# Patient Record
Sex: FEMALE | Race: WHITE | NOT HISPANIC OR LATINO
[De-identification: names, ages, dates, MRNs, and addresses within clinical notes are randomized per-mention and may not be internally consistent; named-entity substitution may affect disease eponyms.]

---

## 2022-04-26 ENCOUNTER — APPOINTMENT (OUTPATIENT)
Dept: ORTHOPEDIC SURGERY | Facility: CLINIC | Age: 47
End: 2022-04-26

## 2022-05-10 ENCOUNTER — APPOINTMENT (OUTPATIENT)
Dept: ORTHOPEDIC SURGERY | Facility: CLINIC | Age: 47
End: 2022-05-10
Payer: COMMERCIAL

## 2022-05-10 VITALS
HEART RATE: 73 BPM | HEIGHT: 62 IN | DIASTOLIC BLOOD PRESSURE: 87 MMHG | SYSTOLIC BLOOD PRESSURE: 122 MMHG | BODY MASS INDEX: 28.52 KG/M2 | WEIGHT: 155 LBS

## 2022-05-10 DIAGNOSIS — M60.9 MYOSITIS, UNSPECIFIED: ICD-10-CM

## 2022-05-10 DIAGNOSIS — M51.36 OTHER INTERVERTEBRAL DISC DEGENERATION, LUMBAR REGION: ICD-10-CM

## 2022-05-10 DIAGNOSIS — Z86.39 PERSONAL HISTORY OF OTHER ENDOCRINE, NUTRITIONAL AND METABOLIC DISEASE: ICD-10-CM

## 2022-05-10 PROCEDURE — 72100 X-RAY EXAM L-S SPINE 2/3 VWS: CPT

## 2022-05-10 PROCEDURE — 99203 OFFICE O/P NEW LOW 30 MIN: CPT

## 2022-05-10 RX ORDER — TRAZODONE HYDROCHLORIDE 300 MG/1
TABLET ORAL
Refills: 0 | Status: ACTIVE | COMMUNITY

## 2022-05-10 NOTE — HISTORY OF PRESENT ILLNESS
[de-identified] : 46 year old F presents for an initial evaluation of lower back pain since 2015. She was last evaluated in 2015. She states previous epidurals have provided significant relief. No relief with oral antiinflammatories. She states PT has also helped previously. KEM questionnaire negative  [Ataxia] : no ataxia [Incontinence] : no incontinence [Loss of Dexterity] : good dexterity [Urinary Ret.] : no urinary retention

## 2022-05-10 NOTE — PHYSICAL EXAM
[Poor Appearance] : well-appearing [Acute Distress] : not in acute distress [Obese] : not obese [de-identified] : CONSTITUTIONAL: The patient is a very pleasant individual who is well-nourished and who appears stated age.\par PSYCHIATRIC: The patient is alert and oriented X 3 and in no apparent distress, and participates with orthopedic evaluation well.\par HEAD: Atraumatic and is nonsyndromic in appearance.\par EENT: No visible thyromegaly, EOMI.\par RESPIRATORY: Respiratory rate is regular, not dyspneic on examination.\par LYMPHATICS: There is no inguinal lymphadenopathy\par INTEGUMENTARY: Skin is clean, dry, and intact about the bilateral lower extremities and lumbar spine.\par VASCULAR: There is brisk capillary refill about the bilateral lower extremities.\par NEUROLOGIC: There are no pathologic reflexes. There is no decrease in sensation of the bilateral lower extremities on Wartenberg pinwheel examination. Deep tendon reflexes are well maintained at 2+/4 of the bilateral lower extremities and are symmetric.\par MUSCULOSKELETAL: There is no visible muscular atrophy. Manual motor strength is well maintained in the bilateral lower extremities. Mechanically oriented lower back pain. Negative tension sign and straight leg raise bilaterally. Quad extension, ankle dorsiflexion, EHL, plantar flexion, and ankle eversion are well preserved. Normal secondary orthopaedic exam of bilateral hips, greater trochanteric area, knees and ankles. No pain with internal or external rotation of the bilateral hips.  [de-identified] : AP and Lateral views of the lumbar spine taken 05/10/2022 demonstrates mild age appropriate lumbar degenerative disc disease, essentially unchanged from 2015 with only mild and age appropriate changes.

## 2022-05-10 NOTE — ADDENDUM
[FreeTextEntry1] : Documented by Enoch Olivarez acting as a scribe for Dr. Trever Hairston on 05/10/2022. All medical record entries made by the Scribe were at my, Dr. Trever Hairston, direction and personally dictated by me on 05/10/2022 . I have reviewed the chart and agree that the record accurately reflects my personal performance of the history, physical exam, assessment and plan. I have also personally directed, reviewed, and agreed with the chart.

## 2022-06-07 ENCOUNTER — APPOINTMENT (OUTPATIENT)
Dept: PHYSICAL MEDICINE AND REHAB | Facility: CLINIC | Age: 47
End: 2022-06-07
Payer: COMMERCIAL

## 2022-06-07 VITALS
BODY MASS INDEX: 29.27 KG/M2 | DIASTOLIC BLOOD PRESSURE: 68 MMHG | RESPIRATION RATE: 12 BRPM | HEIGHT: 61 IN | HEART RATE: 80 BPM | WEIGHT: 155 LBS | SYSTOLIC BLOOD PRESSURE: 107 MMHG

## 2022-06-07 DIAGNOSIS — M53.3 SACROCOCCYGEAL DISORDERS, NOT ELSEWHERE CLASSIFIED: ICD-10-CM

## 2022-06-07 DIAGNOSIS — Z78.9 OTHER SPECIFIED HEALTH STATUS: ICD-10-CM

## 2022-06-07 PROCEDURE — 99204 OFFICE O/P NEW MOD 45 MIN: CPT

## 2022-06-07 RX ORDER — CLONAZEPAM 0.5 MG/1
0.5 TABLET ORAL
Qty: 60 | Refills: 0 | Status: ACTIVE | COMMUNITY
Start: 2022-03-15

## 2022-06-07 RX ORDER — METHYLPREDNISOLONE 4 MG/1
4 TABLET ORAL
Qty: 1 | Refills: 0 | Status: COMPLETED | COMMUNITY
Start: 2022-05-10 | End: 2022-06-07

## 2022-06-07 NOTE — ASSESSMENT
[FreeTextEntry1] : 47 y.o. F w/ h/o significant anxiety and chronic lumbosacral back pain suspicious for SI joint dysfunction.  I spent most of today's office visit (30 min) reviewing the patient's old MRIs, discussing possible etiology, pathogenesis, further diagnostic work-up and non-operative management.  Pt. may also have underlying central sensitivity pain syndrome (ie, FM) given h/o anxiety, depression, chronic MSK pain, etc.  Will screen w/ ACR Criteria for FM at next visit.  Discussed R/B/A to b/l SI joint CSI which patient has agreed to.  Advised caution w/ chronic use of PO NSAIDs 2' GI/cardiac/renal toxicities.  Pt. is in agreement with plan.  All questions answered.  RTC 2 weeks post-procedure.

## 2022-06-07 NOTE — PHYSICAL EXAM
[FreeTextEntry1] : NAD\par A&Ox3\par Non-obese\par ROM L-spine: 1/2 full forward flexion before pain; unable to tolerated passive lumbar extension 2' pain\par ROM Hips: smooth IR/ER w/o pain\par Pelvic tilt: ++\par Seated slump test: neg\par SLR: neg (tight hamstrings b/l)\par MARINA's: neg b/l\par DTR's: depressed b/l knees/ankles\par MMT: 5-/5 b/l HFs 2' pain; 5/5 DF/PF\par Sensation: SILT\par Toe & Heel Walk: Yes\par Palpation: b/l SI joint VTTP (concordant); b/l GT femur TTP (less concordant)\par

## 2022-06-07 NOTE — HISTORY OF PRESENT ILLNESS
[FreeTextEntry1] : 47 y.o. F w/ h/o significant anxiety and depression presents to office w/ c/o CLBP for about 5 years.  Pt. denies antecedent trauma or injury to L-spine.  Pain is mostly left sided lumbosacral spine w/ radiation to left posterolateral  hip but not down leg.  Endorses some numbness and "stabbing" pains.  Denies N/T/B in feet.  X-rays done in Dr. Hairston's office.  MRI L-spine 5 years ago.  Pt. states that she will start P.T. today.  Takes OTC ibuprofen 3 tabs tid + 3 acetaminophen per day.  Pt. had LESI in early 2000's which were helpful.

## 2022-06-07 NOTE — DATA REVIEWED
[MRI] : MRI [FreeTextEntry1] : MRI L-spine (2015): Mild degenerative disc disease throughout the lumbar spine. There are shallow disc protrusion at L3-L4. There is no cord compression or compression of exiting nerve roots.\par \par MRI T-spine (2015): No spinal canal or neural foraminal narrowing within the thoracic spine. Mild discogenic degenerative disease at T11-T12 and T12-L1.

## 2024-09-15 ENCOUNTER — NON-APPOINTMENT (OUTPATIENT)
Age: 49
End: 2024-09-15

## 2024-09-17 ENCOUNTER — OUTPATIENT (OUTPATIENT)
Dept: OUTPATIENT SERVICES | Facility: HOSPITAL | Age: 49
LOS: 1 days | Discharge: ANOTHER HOSPITAL | End: 2024-09-17

## 2024-09-18 DIAGNOSIS — F32.A DEPRESSION, UNSPECIFIED: ICD-10-CM

## 2024-11-22 ENCOUNTER — NON-APPOINTMENT (OUTPATIENT)
Age: 49
End: 2024-11-22

## 2024-11-26 ENCOUNTER — NON-APPOINTMENT (OUTPATIENT)
Age: 49
End: 2024-11-26